# Patient Record
Sex: MALE | Race: WHITE
[De-identification: names, ages, dates, MRNs, and addresses within clinical notes are randomized per-mention and may not be internally consistent; named-entity substitution may affect disease eponyms.]

---

## 2018-03-06 ENCOUNTER — HOSPITAL ENCOUNTER (EMERGENCY)
Dept: HOSPITAL 17 - NEPJ | Age: 53
LOS: 1 days | Discharge: HOME | End: 2018-03-07
Payer: COMMERCIAL

## 2018-03-06 VITALS — TEMPERATURE: 97.6 F | OXYGEN SATURATION: 99 % | HEART RATE: 88 BPM | RESPIRATION RATE: 18 BRPM

## 2018-03-06 DIAGNOSIS — Z72.0: ICD-10-CM

## 2018-03-06 DIAGNOSIS — E07.9: ICD-10-CM
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
## 2018-03-06 DIAGNOSIS — Z79.899: ICD-10-CM

## 2018-03-06 DIAGNOSIS — F10.129: Primary | ICD-10-CM

## 2018-03-06 LAB
BASOPHILS # BLD AUTO: 0.1 TH/MM3 (ref 0–0.2)
BASOPHILS NFR BLD: 0.5 % (ref 0–2)
EOSINOPHIL # BLD: 0.1 TH/MM3 (ref 0–0.4)
EOSINOPHIL NFR BLD: 1 % (ref 0–4)
ERYTHROCYTE [DISTWIDTH] IN BLOOD BY AUTOMATED COUNT: 13 % (ref 11.6–17.2)
HCT VFR BLD CALC: 43.2 % (ref 39–51)
HGB BLD-MCNC: 15.1 GM/DL (ref 13–17)
LYMPHOCYTES # BLD AUTO: 1.2 TH/MM3 (ref 1–4.8)
LYMPHOCYTES NFR BLD AUTO: 11.1 % (ref 9–44)
MCH RBC QN AUTO: 32.7 PG (ref 27–34)
MCHC RBC AUTO-ENTMCNC: 35 % (ref 32–36)
MCV RBC AUTO: 93.6 FL (ref 80–100)
MONOCYTE #: 1.3 TH/MM3 (ref 0–0.9)
MONOCYTES NFR BLD: 11.6 % (ref 0–8)
NEUTROPHILS # BLD AUTO: 8.5 TH/MM3 (ref 1.8–7.7)
NEUTROPHILS NFR BLD AUTO: 75.8 % (ref 16–70)
PLATELET # BLD: 167 TH/MM3 (ref 150–450)
PMV BLD AUTO: 8 FL (ref 7–11)
RBC # BLD AUTO: 4.62 MIL/MM3 (ref 4.5–5.9)
WBC # BLD AUTO: 11.2 TH/MM3 (ref 4–11)

## 2018-03-06 PROCEDURE — 80053 COMPREHEN METABOLIC PANEL: CPT

## 2018-03-06 PROCEDURE — 80307 DRUG TEST PRSMV CHEM ANLYZR: CPT

## 2018-03-06 PROCEDURE — 99283 EMERGENCY DEPT VISIT LOW MDM: CPT

## 2018-03-06 PROCEDURE — 84443 ASSAY THYROID STIM HORMONE: CPT

## 2018-03-06 PROCEDURE — 85025 COMPLETE CBC W/AUTO DIFF WBC: CPT

## 2018-03-07 VITALS
DIASTOLIC BLOOD PRESSURE: 77 MMHG | OXYGEN SATURATION: 98 % | HEART RATE: 107 BPM | RESPIRATION RATE: 18 BRPM | SYSTOLIC BLOOD PRESSURE: 123 MMHG

## 2018-03-07 VITALS
SYSTOLIC BLOOD PRESSURE: 146 MMHG | OXYGEN SATURATION: 99 % | DIASTOLIC BLOOD PRESSURE: 97 MMHG | HEART RATE: 72 BPM | RESPIRATION RATE: 18 BRPM | TEMPERATURE: 98.8 F

## 2018-03-07 LAB
ALBUMIN SERPL-MCNC: 4.6 GM/DL (ref 3.4–5)
ALP SERPL-CCNC: 80 U/L (ref 45–117)
ALT SERPL-CCNC: 34 U/L (ref 12–78)
AST SERPL-CCNC: 22 U/L (ref 15–37)
BILIRUB SERPL-MCNC: 0.6 MG/DL (ref 0.2–1)
BUN SERPL-MCNC: 13 MG/DL (ref 7–18)
CALCIUM SERPL-MCNC: 9 MG/DL (ref 8.5–10.1)
CHLORIDE SERPL-SCNC: 102 MEQ/L (ref 98–107)
CREAT SERPL-MCNC: 1.07 MG/DL (ref 0.6–1.3)
GFR SERPLBLD BASED ON 1.73 SQ M-ARVRAT: 73 ML/MIN (ref 89–?)
GLUCOSE SERPL-MCNC: 114 MG/DL (ref 74–106)
HCO3 BLD-SCNC: 23.1 MEQ/L (ref 21–32)
PROT SERPL-MCNC: 8.3 GM/DL (ref 6.4–8.2)
SODIUM SERPL-SCNC: 138 MEQ/L (ref 136–145)

## 2018-03-07 NOTE — PD
HPI


Chief Complaint:  Psychiatric Symptoms


Time Seen by Provider:  23:04


Travel History


International Travel<30 days:  No


Contact w/Intl Traveler<30days:  No


Traveled to known affect area:  No





History of Present Illness


HPI


53-year-old white male presents emergency department under a Baker act by PD.  

The patient had made homicidal statements while in a hotel.  The patient here 

states that this was misconstrued.  He states that he had never made any 

statements directly to hurt anyone or hurt himself.  He denies any suicidal 

homicidal ideation.  No toxic ingestions.  He does admit to drinking alcohol 

earlier.  No medical complaint.





PFSH


Past Medical History


Medical History:  Unable to Obtain


Autoimmune Disease:  Yes


Anxiety:  Yes (FEAR OF FLYING)


Integumentary:  Yes (PSORISIS)


Thyroid Disease:  Yes


Tetanus Vaccination:  < 5 Years


Pregnant?:  Not Pregnant





Past Surgical History


Surgical History:  Unable to Obtain


Other Surgery:  Yes (HERNIA REPAIR AS A CHILD)





Social History


Alcohol Use:  Yes (DAILY)


Tobacco Use:  Yes


Substance Use:  Yes (NIGHTLY ALCOHOL)





Allergies-Medications


(Allergen,Severity, Reaction):  


Coded Allergies:  


     bupropion (Verified  Allergy, Unknown, 3/6/18)


     lisinopril (Verified  Allergy, Unknown, 3/6/18)


Reported Meds & Prescriptions





Reported Meds & Active Scripts


Active


Reported


Thyroid Porcine (Thyroid) 100 % Usp Pow 75 Mg PO DAILY








Review of Systems


General / Constitutional:  No: Fever


Eyes:  No: Visual changes


HENT:  No: Headaches


Cardiovascular:  No: Chest Pain or Discomfort


Respiratory:  No: Shortness of Breath


Gastrointestinal:  No: Abdominal Pain


Genitourinary:  No: Dysuria


Musculoskeletal:  No: Pain


Skin:  No Rash


Neurologic:  No: Weakness


Psychiatric:  Positive: Mood Disorder, No: Anxiety, Depression, Suicidal 

Ideations, Disorder of Thought, Substance Abuse, Homicidal Ideation


Endocrine:  No: Polydipsia


Hematologic/Lymphatic:  No: Easy Bruising





Physical Exam


Narrative


GENERAL: Well-nourished, well-developed patient.


SKIN: Warm and dry.


HEAD: Normocephalic and atraumatic.


EYES: No scleral icterus. No injection or drainage. 


ENT: No nasal drainage noted. Mucous membranes pink. Airway patent.


NECK: Supple, trachea midline.  Moves head freely without obvious discomfort.


CARDIOVASCULAR: Regular rate and rhythm without murmurs, gallops, or rubs. 


RESPIRATORY: Breath sounds equal bilaterally. No accessory muscle use.


GASTROINTESTINAL: Abdomen soft, non-tender, nondistended. 


EXTREMITIES: No cyanosis or edema.


BACK: Nontender without obvious deformity. No CVA tenderness.


NEURO: Patient is alert and oriented. no sensorimotor deficits.  Nonfocal.  

Normal speech.


PSYCH: No delusions.  No auditory or visual hallucinations.





Data


Data


Last Documented VS





Vital Signs








  Date Time  Temp Pulse Resp B/P (MAP) Pulse Ox O2 Delivery O2 Flow Rate FiO2


 


3/7/18 02:35  107 18 123/77 (92) 98 Room Air  


 


3/6/18 22:56 97.6       








Orders





 Orders


Complete Blood Count With Diff (3/6/18 23:01)


Comprehensive Metabolic Panel (3/6/18 23:01)


Psych Screen (3/6/18 23:01)


Drug Screen, Random Urine (3/6/18 23:01)


Alcohol (Ethanol) (3/6/18 23:01)


Thyroid Stimulating Hormone (3/6/18 23:25)





Labs





Laboratory Tests








Test


  3/6/18


23:20 3/6/18


23:25


 


Urine Opiates Screen NEG  


 


Urine Barbiturates Screen NEG  


 


Urine Amphetamines Screen NEG  


 


Urine Benzodiazepines Screen NEG  


 


Urine Cocaine Screen NEG  


 


Urine Cannabinoids Screen NEG  


 


White Blood Count  11.2 TH/MM3 


 


Red Blood Count  4.62 MIL/MM3 


 


Hemoglobin  15.1 GM/DL 


 


Hematocrit  43.2 % 


 


Mean Corpuscular Volume  93.6 FL 


 


Mean Corpuscular Hemoglobin  32.7 PG 


 


Mean Corpuscular Hemoglobin


Concent 


  35.0 % 


 


 


Red Cell Distribution Width  13.0 % 


 


Platelet Count  167 TH/MM3 


 


Mean Platelet Volume  8.0 FL 


 


Neutrophils (%) (Auto)  75.8 % 


 


Lymphocytes (%) (Auto)  11.1 % 


 


Monocytes (%) (Auto)  11.6 % 


 


Eosinophils (%) (Auto)  1.0 % 


 


Basophils (%) (Auto)  0.5 % 


 


Neutrophils # (Auto)  8.5 TH/MM3 


 


Lymphocytes # (Auto)  1.2 TH/MM3 


 


Monocytes # (Auto)  1.3 TH/MM3 


 


Eosinophils # (Auto)  0.1 TH/MM3 


 


Basophils # (Auto)  0.1 TH/MM3 


 


CBC Comment  DIFF FINAL 


 


Differential Comment   


 


Blood Urea Nitrogen  13 MG/DL 


 


Creatinine  1.07 MG/DL 


 


Random Glucose  114 MG/DL 


 


Total Protein  8.3 GM/DL 


 


Albumin  4.6 GM/DL 


 


Calcium Level  9.0 MG/DL 


 


Alkaline Phosphatase  80 U/L 


 


Aspartate Amino Transf


(AST/SGOT) 


  22 U/L 


 


 


Alanine Aminotransferase


(ALT/SGPT) 


  34 U/L 


 


 


Total Bilirubin  0.6 MG/DL 


 


Sodium Level  138 MEQ/L 


 


Potassium Level  3.6 MEQ/L 


 


Chloride Level  102 MEQ/L 


 


Carbon Dioxide Level  23.1 MEQ/L 


 


Anion Gap  13 MEQ/L 


 


Estimat Glomerular Filtration


Rate 


  73 ML/MIN 


 


 


Thyroid Stimulating Hormone


3rd Gen 


  2.530 uIU/ML 


 


 


Ethyl Alcohol Level  177 MG/DL 











MDM


Medical Decision Making


Medical Screen Exam Complete:  Yes


Emergency Medical Condition:  Yes


Medical Record Reviewed:  Yes


Interpretation(s)





Laboratory Tests








Test


  3/6/18


23:20 3/6/18


23:25


 


Urine Opiates Screen NEG  


 


Urine Barbiturates Screen NEG  


 


Urine Amphetamines Screen NEG  


 


Urine Benzodiazepines Screen NEG  


 


Urine Cocaine Screen NEG  


 


Urine Cannabinoids Screen NEG  


 


White Blood Count  11.2 TH/MM3 


 


Red Blood Count  4.62 MIL/MM3 


 


Hemoglobin  15.1 GM/DL 


 


Hematocrit  43.2 % 


 


Mean Corpuscular Volume  93.6 FL 


 


Mean Corpuscular Hemoglobin  32.7 PG 


 


Mean Corpuscular Hemoglobin


Concent 


  35.0 % 


 


 


Red Cell Distribution Width  13.0 % 


 


Platelet Count  167 TH/MM3 


 


Mean Platelet Volume  8.0 FL 


 


Neutrophils (%) (Auto)  75.8 % 


 


Lymphocytes (%) (Auto)  11.1 % 


 


Monocytes (%) (Auto)  11.6 % 


 


Eosinophils (%) (Auto)  1.0 % 


 


Basophils (%) (Auto)  0.5 % 


 


Neutrophils # (Auto)  8.5 TH/MM3 


 


Lymphocytes # (Auto)  1.2 TH/MM3 


 


Monocytes # (Auto)  1.3 TH/MM3 


 


Eosinophils # (Auto)  0.1 TH/MM3 


 


Basophils # (Auto)  0.1 TH/MM3 


 


CBC Comment  DIFF FINAL 


 


Differential Comment   


 


Blood Urea Nitrogen  13 MG/DL 


 


Creatinine  1.07 MG/DL 


 


Random Glucose  114 MG/DL 


 


Total Protein  8.3 GM/DL 


 


Albumin  4.6 GM/DL 


 


Calcium Level  9.0 MG/DL 


 


Alkaline Phosphatase  80 U/L 


 


Aspartate Amino Transf


(AST/SGOT) 


  22 U/L 


 


 


Alanine Aminotransferase


(ALT/SGPT) 


  34 U/L 


 


 


Total Bilirubin  0.6 MG/DL 


 


Sodium Level  138 MEQ/L 


 


Potassium Level  3.6 MEQ/L 


 


Chloride Level  102 MEQ/L 


 


Carbon Dioxide Level  23.1 MEQ/L 


 


Anion Gap  13 MEQ/L 


 


Estimat Glomerular Filtration


Rate 


  73 ML/MIN 


 


 


Thyroid Stimulating Hormone


3rd Gen 


  2.530 uIU/ML 


 


 


Ethyl Alcohol Level  177 MG/DL 








Differential Diagnosis


MDM: High


Differential diagnoses: Schizophrenia, schizoaffective disorder, bipolar, 

anxiety, depression, adjustment reaction, mood disorder NOS, ODD, depressive 

disorder NOS, dementia, dementia with agitation, psychosis NOS, substance 

induced mood disorder, DMDD, Asperger syndrome, infection,electrolyte 

abnormality, malingering.


Narrative Course


Mental health screening discussed with the patient.  Psychiatric screen ordered.





The patient has been medically cleared.





This is medical clearance for psychiatric admission, alcohol intoxication





Diagnosis





 Primary Impression:  


 Medical clearance for psychiatric admission


 Additional Impression:  


 Alcohol intoxication


Condition:  Stable











Malik Mcadams Mar 7, 2018 06:07

## 2018-03-07 NOTE — PD.PSY.CON
Provisional Diagnosis


Admission Date





Axis I.


Alcohol induced mood disorder, alcohol use disorder


Axis II.


Deferred


Axis III.


Hypertension





History of Present Illness


Service


Psychiatry


Consult Requested By


ER


Reason for Consult


Homicidal statements


Primary Care Physician


No Primary Care Physician


HPI


Patient was seen today at 11 AM in the morning.





The patient is a 53-year-old  man, his domiciled in [place], single, 

employed, he is here in the [facility] for vacations, patient has no previous 

psychiatric history, no previous suicidal attempts, no previous psychiatric 

hospitalizations, he does have history of alcohol use disorder, medical history 

hypertension, who presents in the emergency department under a Baker act by PD.

  The patient had made homicidal statements while in a hotel in the context of 

alcohol intoxication.  The patient here states that this was misconstrued.  He 

states that he had never made any statements directly to hurt anyone or hurt 

himself.  He denies any suicidal homicidal ideation.  No toxic ingestions.  He 

does admit to drinking alcohol earlier.  No medical complaint.  On psychiatric 

evaluation the patient is found in his room, he seems to be upset, irritable, a 

little bit oppositional.  He says that being in a psychiatric garcia is a 

ridiculous thing.  He says that he never made any homicidal statement.  He says 

that he was drunk and he was just joking with somebody in the hotel.  The 

patient denies depressive symptoms, he denies anxiety, he denies dagoberto and 

psychosis.  He denies suicidal and homicidal ideation, he denies visual and 

auditory hallucinations.  The patient says that he has been working for a long 

time as a manager in a big company.  He denies history of violence, he denies 

guns possession, he says that he is only contact with law in the past was for a 

DUI.  We got collateral information from his brother Howard Roper and support 

from his sister Miss Roper, who lives here in [place].  They both say that the 

patient does not have any history of violence.  The patient is a [place] person

, he doesn't have any psychiatric history.  He does abuse alcohol and he does 

"say stupid things on the alcohol intoxication".  They don't think that his 

brother is able to execute any homicidal action.  His sister who lives here in 

[place] is going to come personality from her house to  the patient and 

make sure that he is going back safely home.





Review of Systems


Constitutional:  DENIES: Diaphoretic episodes, Fatigue, Fever, Weight gain, 

Weight loss, Chills, Dizziness, Change in appetite, Night Sweats


Endocrine:  DENIES: Heat/cold intolerance, Polydipsia, Polyuria, Polyphagia


Eyes:  DENIES: Blurred vision, Diplopia, Eye inflammation, Eye pain, Vision loss

, Photosensitivity, Double Vision


Ears, nose, mouth, throat:  DENIES: Tinnitus, Hearing loss, Vertigo, Nasal 

discharge, Oral lesions, Throat pain, Hoarseness, Ear Pain, Running Nose, 

Epistaxis, Sinus Pain, Toothache, Odynophagia


Respiratory:  DENIES: Apneas, Cough, Snoring, Wheezing, Hemoptysis, Sputum 

production, Shortness of breath


Cardiovascular:  DENIES: Chest pain, Palpitations, Syncope, Dyspnea on Exertion

, PND, Lower Extremity Edema, Orthopnea, Claudication


Gastrointestinal:  DENIES: Abdominal pain, Black stools, Bloody stools, 

Constipation, Diarrhea, Nausea, Vomiting, Difficulty Swallowing, Anorexia


Genitourinary:  DENIES: Sexual dysfunction, Urinary frequency, Urinary 

incontinence, Urgency, Hematuria, Dysuria, Nocturia, Penile Discharge, 

Testicular Pain, Testicular Swelling


Musculoskeletal:  DENIES: Joint pain, Muscle aches, Stiffness, Joint Swelling, 

Back pain, Neck pain


Integumentary:  DENIES: Abnormal pigmentation, Nail changes, Pruritus, Rash


Hematologic/lymphatic:  DENIES: Bruising, Lymphadenopathy


Immunologic/allergic:  DENIES: Eczema, Urticaria


Neurologic:  DENIES: Abnormal gait, Headache, Localized weakness, Paresthesias, 

Seizures, Speech Problems, Tremor, Poor Balance


Psychiatric:  DENIES: Anxiety, Confusion, Mood changes, Depression, 

Hallucinations, Agitation, Suicidal Ideation, Homicidal Ideation, Delusions





Past Family Social History


Coded Allergies:  


     bupropion (Verified  Allergy, Unknown, 3/6/18)


     lisinopril (Verified  Allergy, Unknown, 3/6/18)


Reported Medications


Thyroid (Thyroid Porcine) 100 % Usp Pow, 75 MG PO DAILY


   3/7/18


Family Psych History


No family psychiatric history


Social History


Patient was born and raised in [place], he lives in [place], his single, no kids

, employed, highest level of education is a bachelor degree.


Patient's Strengths (min. 2)


No tremors, no EPS, no withdrawal





Physical Exam


Vital Signs





Vital Signs








  Date Time  Temp Pulse Resp B/P (MAP) Pulse Ox O2 Delivery O2 Flow Rate FiO2


 


3/7/18 02:35  107 18 123/77 (92) 98 Room Air  


 


3/6/18 22:56 97.6       








Lab Results











Test


  3/6/18


23:20 3/6/18


23:25


 


Urine Opiates Screen NEG  


 


Urine Barbiturates Screen NEG  


 


Urine Amphetamines Screen NEG  


 


Urine Benzodiazepines Screen NEG  


 


Urine Cocaine Screen NEG  


 


Urine Cannabinoids Screen NEG  


 


White Blood Count  11.2 TH/MM3 


 


Red Blood Count  4.62 MIL/MM3 


 


Hemoglobin  15.1 GM/DL 


 


Hematocrit  43.2 % 


 


Mean Corpuscular Volume  93.6 FL 


 


Mean Corpuscular Hemoglobin  32.7 PG 


 


Mean Corpuscular Hemoglobin


Concent 


  35.0 % 


 


 


Red Cell Distribution Width  13.0 % 


 


Platelet Count  167 TH/MM3 


 


Mean Platelet Volume  8.0 FL 


 


Neutrophils (%) (Auto)  75.8 % 


 


Lymphocytes (%) (Auto)  11.1 % 


 


Monocytes (%) (Auto)  11.6 % 


 


Eosinophils (%) (Auto)  1.0 % 


 


Basophils (%) (Auto)  0.5 % 


 


Neutrophils # (Auto)  8.5 TH/MM3 


 


Lymphocytes # (Auto)  1.2 TH/MM3 


 


Monocytes # (Auto)  1.3 TH/MM3 


 


Eosinophils # (Auto)  0.1 TH/MM3 


 


Basophils # (Auto)  0.1 TH/MM3 


 


CBC Comment  DIFF FINAL 


 


Differential Comment   


 


Blood Urea Nitrogen  13 MG/DL 


 


Creatinine  1.07 MG/DL 


 


Random Glucose  114 MG/DL 


 


Total Protein  8.3 GM/DL 


 


Albumin  4.6 GM/DL 


 


Calcium Level  9.0 MG/DL 


 


Alkaline Phosphatase  80 U/L 


 


Aspartate Amino Transf


(AST/SGOT) 


  22 U/L 


 


 


Alanine Aminotransferase


(ALT/SGPT) 


  34 U/L 


 


 


Total Bilirubin  0.6 MG/DL 


 


Sodium Level  138 MEQ/L 


 


Potassium Level  3.6 MEQ/L 


 


Chloride Level  102 MEQ/L 


 


Carbon Dioxide Level  23.1 MEQ/L 


 


Anion Gap  13 MEQ/L 


 


Estimat Glomerular Filtration


Rate 


  73 ML/MIN 


 


 


Thyroid Stimulating Hormone


3rd Gen 


  2.530 uIU/ML 


 


 


Ethyl Alcohol Level  177 MG/DL 











Mental Status Examination


Appearance:  Appropriate


Consciousness:  Alert


Orientation:  x4


Motor Activity:  Normal gait


Speech:  Unremarkable


Language:  Adequate


Fund of Knowledge:  Adequate


Attention and Concentration:  Adequate


Memory:  Unremarkable


Mood:  Appropriate


Affect:  Appropriate


Thought Process & Associations:  Intact


Thought Content:  Appropriate


Hallucination Type:  None


Delusion Type:  None


Suicidal Ideation:  No


Suicidal Plan:  No


Suicidal Intention:  No


Homicidal Ideation:  No


Homicidal Plan:  No


Homicidal Intention:  No


Insight:  Adequate


Judgment:  Adequate





Assessment & Plan


Problem List:  


(1) Alcohol abuse with alcohol-induced mood disorder


ICD Codes:  F10.14 - Alcohol abuse with alcohol-induced mood disorder


Assessment & Plan:  On psychiatric evaluation with find the patient is now 

clinically sober.  She denies symptomatology of depression, anxiety, dagoberto and 

psychosis.  The patient denies suicidal and homicidal ideation, he denies 

visual and auditory hallucinations.  During my evaluation no agitation, no 

aggressive behavior, no delusion, no loosening of associations, ideas of 

reference, paranoia, attention deficit are present.  The patient is fully 

oriented 3, logical, coherent and relevant.  Seems to me that his reason 

homicidal statement in the hotel was made in the context of acute alcohol 

intoxication.  This is a patient who allegedly doesn't have any psychiatric 

history, no history of violence, no guns pulsation, this information was 

confirmed by his 2 siblings.  He does not meet criteria for involuntary 

psychiatric admission at this moment.  Extensive psychoeducation, supportive 

motivation provided.  The patient will be discharged with his sister.  Monique 

act will be lifted.





Assessment & Plan


Estimated LOS:  Han Basurto MD Mar 7, 2018 14:34